# Patient Record
Sex: FEMALE | Race: WHITE | NOT HISPANIC OR LATINO
[De-identification: names, ages, dates, MRNs, and addresses within clinical notes are randomized per-mention and may not be internally consistent; named-entity substitution may affect disease eponyms.]

---

## 2023-11-01 PROBLEM — Z00.00 ENCOUNTER FOR PREVENTIVE HEALTH EXAMINATION: Status: ACTIVE | Noted: 2023-11-01

## 2023-11-06 ENCOUNTER — APPOINTMENT (OUTPATIENT)
Dept: BREAST CENTER | Facility: CLINIC | Age: 68
End: 2023-11-06
Payer: COMMERCIAL

## 2023-11-06 VITALS
HEIGHT: 65 IN | WEIGHT: 168 LBS | BODY MASS INDEX: 27.99 KG/M2 | TEMPERATURE: 98.4 F | DIASTOLIC BLOOD PRESSURE: 74 MMHG | HEART RATE: 74 BPM | OXYGEN SATURATION: 96 % | SYSTOLIC BLOOD PRESSURE: 127 MMHG

## 2023-11-06 VITALS — WEIGHT: 168 LBS | HEIGHT: 65 IN | BODY MASS INDEX: 27.99 KG/M2

## 2023-11-06 DIAGNOSIS — I25.10 ATHEROSCLEROTIC HEART DISEASE OF NATIVE CORONARY ARTERY W/OUT ANGINA PECTORIS: ICD-10-CM

## 2023-11-06 DIAGNOSIS — R92.8 OTHER ABNORMAL AND INCONCLUSIVE FINDINGS ON DIAGNOSTIC IMAGING OF BREAST: ICD-10-CM

## 2023-11-06 DIAGNOSIS — I07.1 RHEUMATIC TRICUSPID INSUFFICIENCY: ICD-10-CM

## 2023-11-06 DIAGNOSIS — Z87.19 PERSONAL HISTORY OF OTHER DISEASES OF THE DIGESTIVE SYSTEM: ICD-10-CM

## 2023-11-06 DIAGNOSIS — Z78.9 OTHER SPECIFIED HEALTH STATUS: ICD-10-CM

## 2023-11-06 DIAGNOSIS — M47.812 SPONDYLOSIS W/OUT MYELOPATHY OR RADICULOPATHY, CERVICAL REGION: ICD-10-CM

## 2023-11-06 DIAGNOSIS — I35.1 NONRHEUMATIC AORTIC (VALVE) INSUFFICIENCY: ICD-10-CM

## 2023-11-06 DIAGNOSIS — Z80.41 FAMILY HISTORY OF MALIGNANT NEOPLASM OF OVARY: ICD-10-CM

## 2023-11-06 DIAGNOSIS — Z80.1 FAMILY HISTORY OF MALIGNANT NEOPLASM OF TRACHEA, BRONCHUS AND LUNG: ICD-10-CM

## 2023-11-06 DIAGNOSIS — Z86.79 PERSONAL HISTORY OF OTHER DISEASES OF THE CIRCULATORY SYSTEM: ICD-10-CM

## 2023-11-06 DIAGNOSIS — Z86.39 PERSONAL HISTORY OF OTHER ENDOCRINE, NUTRITIONAL AND METABOLIC DISEASE: ICD-10-CM

## 2023-11-06 DIAGNOSIS — R59.0 LOCALIZED ENLARGED LYMPH NODES: ICD-10-CM

## 2023-11-06 PROCEDURE — 99204 OFFICE O/P NEW MOD 45 MIN: CPT

## 2023-11-06 RX ORDER — GABAPENTIN 100 MG/1
CAPSULE ORAL
Refills: 0 | Status: ACTIVE | COMMUNITY

## 2023-11-06 RX ORDER — ORAL SEMAGLUTIDE 14 MG/1
TABLET ORAL
Refills: 0 | Status: ACTIVE | COMMUNITY

## 2023-11-06 RX ORDER — LOSARTAN POTASSIUM 50 MG/1
50 TABLET, FILM COATED ORAL
Refills: 0 | Status: ACTIVE | COMMUNITY

## 2023-11-06 RX ORDER — LEVOTHYROXINE SODIUM 137 UG/1
TABLET ORAL
Refills: 0 | Status: ACTIVE | COMMUNITY

## 2023-11-06 RX ORDER — HYDROCHLOROTHIAZIDE 12.5 MG/1
12.5 CAPSULE ORAL
Refills: 0 | Status: ACTIVE | COMMUNITY

## 2023-11-06 RX ORDER — CHROMIUM 200 MCG
TABLET ORAL
Refills: 0 | Status: ACTIVE | COMMUNITY

## 2023-11-27 ENCOUNTER — NON-APPOINTMENT (OUTPATIENT)
Age: 68
End: 2023-11-27

## 2023-11-28 ENCOUNTER — APPOINTMENT (OUTPATIENT)
Dept: BREAST CENTER | Facility: CLINIC | Age: 68
End: 2023-11-28
Payer: COMMERCIAL

## 2023-11-28 VITALS
DIASTOLIC BLOOD PRESSURE: 67 MMHG | SYSTOLIC BLOOD PRESSURE: 113 MMHG | OXYGEN SATURATION: 95 % | WEIGHT: 167 LBS | HEART RATE: 82 BPM | BODY MASS INDEX: 27.79 KG/M2

## 2023-11-28 DIAGNOSIS — N61.0 MASTITIS WITHOUT ABSCESS: ICD-10-CM

## 2023-11-28 PROCEDURE — 99213 OFFICE O/P EST LOW 20 MIN: CPT

## 2023-11-28 RX ORDER — CEFADROXIL 500 MG/1
500 CAPSULE ORAL TWICE DAILY
Qty: 20 | Refills: 0 | Status: ACTIVE | COMMUNITY
Start: 2023-11-28 | End: 1900-01-01

## 2023-12-13 NOTE — ADDENDUM
[FreeTextEntry1] : I spent greater than 75% of consultation in face-to-face counseling and coordination of care for this patient with a palpable lower right axillary lymph node.

## 2023-12-19 ENCOUNTER — APPOINTMENT (OUTPATIENT)
Dept: BREAST CENTER | Facility: CLINIC | Age: 68
End: 2023-12-19
Payer: COMMERCIAL

## 2023-12-19 DIAGNOSIS — R59.0 LOCALIZED ENLARGED LYMPH NODES: ICD-10-CM

## 2023-12-19 DIAGNOSIS — Z80.41 FAMILY HISTORY OF MALIGNANT NEOPLASM OF OVARY: ICD-10-CM

## 2023-12-19 DIAGNOSIS — Z12.39 ENCOUNTER FOR OTHER SCREENING FOR MALIGNANT NEOPLASM OF BREAST: ICD-10-CM

## 2023-12-19 DIAGNOSIS — Z80.3 FAMILY HISTORY OF MALIGNANT NEOPLASM OF BREAST: ICD-10-CM

## 2023-12-19 PROCEDURE — 99213 OFFICE O/P EST LOW 20 MIN: CPT

## 2023-12-19 NOTE — REASON FOR VISIT
[Follow-Up: _____] : a [unfilled] follow-up visit [FreeTextEntry1] : The patient is a postmenopausal white female of [place] descent with a family history of breast and ovarian cancer.  She has been seen in our practice in the past.  She has been complaining of a palpable tender mass underneath her right axilla noted since October 2023.  Mammography and ultrasound did show a slightly enlarged chronic lymph node in the right axilla.  She developed the right chest wall rash which resolved on antibiotics.  She continues have this palpable lower right axillary lymph node and comes in now for follow-up.

## 2023-12-19 NOTE — HISTORY OF PRESENT ILLNESS
[FreeTextEntry1] : The patient is a 68-year-old  postmenopausal white female of [place] descent.  She underwent menarche at age 13 and had her first child at age 33.  She underwent menopause after a TALI/BSO at age 58 in 2013 and she never took any hormone replacement therapy.  She has a strong family history with her mother who had ovarian cancer at age 72 and BRCA tested negative.  She has a maternal cousin who had breast cancer at age 27.  Her brother had lung cancer at age 56.  She had been seen in our practice over 10 years ago for routine surveillance.  She noted a palpable and significantly tender lower right axillary mass in 2023.  She has had a known lower right axilla lymph node seen on routine screening mammography and ultrasound last dated back in 2022.  She was sent for diagnostic right breast mammography and ultrasound performed at [facility] on 2023 showing this palpable density to be the same chronic right axilla lymph node which is slightly increased in size measuring about 1.1 cm with a mildly thickened cortex at 3 mm.  The tenderness over this lymph node has returned and she has developed a significant pruritic rash on the upper right chest wall as well as shoulder and back.  She had been advised to just undergo her routine mammography and ultrasound again in 2023 and to come back for another clinical exam afterwards but she developed a pruritic right chest wall rash extending towards her shoulder and upper back and was placed on cefadroxil antibiotics at the end of November and the rash resolved.  She continues to have this palpable lower right axillary lymph node and comes in now for follow-up.

## 2023-12-19 NOTE — REVIEW OF SYSTEMS
[Breast Pain] : breast pain [Breast Lump] : breast lump [Hot Flashes] : hot flashes [Negative] : Heme/Lymph [de-identified] : right breast lump at 10 o clock

## 2023-12-19 NOTE — PAST MEDICAL HISTORY
[Postmenopausal] : The patient is postmenopausal [Menarche Age ____] : age at menarche was [unfilled] [Menopause Age____] : age at menopause was [unfilled] [unknown] : the patient is unsure of the date of her LMP [Total Preg ___] : G[unfilled] [Age At Live Birth ___] : Age at live birth: [unfilled] [History of Hormone Replacement Treatment] : has no history of hormone replacement treatment

## 2023-12-19 NOTE — PHYSICAL EXAM
[Normocephalic] : normocephalic [Atraumatic] : atraumatic [EOMI] : extra ocular movement intact [Supple] : supple [No Supraclavicular Adenopathy] : no supraclavicular adenopathy [No Cervical Adenopathy] : no cervical adenopathy [Examined in the supine and seated position] : examined in the supine and seated position [No dominant masses] : no dominant masses in right breast  [No dominant masses] : no dominant masses left breast [No Nipple Retraction] : no left nipple retraction [No Nipple Discharge] : no left nipple discharge [Breast Mass Right Breast ___cm] : no masses [Breast Mass Left Breast ___cm] : no masses [No Axillary Lymphadenopathy] : no left axillary lymphadenopathy [No Edema] : no edema [No Rashes] : no rashes [No Ulceration] : no ulceration [Breast Nipple Inversion] : nipples not inverted [Breast Nipple Flattening] : nipples not flattened [Breast Nipple Retraction] : nipples not retracted [Breast Nipple Fissures] : nipples not fissured [Breast Abnormal Lactation (Galactorrhea)] : no galactorrhea [Breast Abnormal Secretion Bloody Fluid] : no bloody discharge [Breast Abnormal Secretion Serous Fluid] : no serous discharge [Breast Abnormal Secretion Opalescent Fluid] : no milky discharge [de-identified] : On exam, the patient has ptotic C-cup breasts.  On palpation, I cannot feel any suspicious densities in either breast.  She does have this palpable stable lower right axillary lymph node measuring about 1.5 cm which is freely mobile and is now tender.  Her prior chest wall rash has resolved.  I cannot feel any other adenopathy. [de-identified] : Palpable lower right axilla lymph node which is freely mobile but the tenderness has resolved.  Her rash along her chest wall has resolved. [de-identified] : Single palpable 1.5 cm lower axillary lymph node which is freely mobile

## 2023-12-19 NOTE — ASSESSMENT
[FreeTextEntry1] : The patient is a 68-year-old  postmenopausal white female of [place] Island[place] descent.  She underwent menarche at age 13 and had her first child at age 33.  She underwent menopause after a TALI/BSO at age 58 in 2013 and she never took any hormone replacement therapy.  She is a diabetic and is on Rybelsus.  She has a strong family history with her mother who had ovarian cancer at age 72 and BRCA tested negative.  She has a maternal cousin who had breast cancer at age 27.  Her brother had lung cancer at age 56.  She had been seen in our practice over 10 years ago for routine surveillance.  She noted a palpable and significantly tender lower right axillary mass in 2023.  She has had a known lower right axilla lymph node seen on routine screening mammography and ultrasound last dated back in 2022.  She was sent for diagnostic right breast mammography and ultrasound performed at [facility] on 2023 showing this palpable density to be the same chronic right axilla lymph node which is slightly increased in size measuring about 1.1 cm with a mildly thickened cortex at 3 mm.  The tenderness over this lymph node had resolved.  I reviewed her right breast mammography and ultrasound which she brought in on CD-ROM from [facility] and indeed she does have this lymph node seen both on mammography and ultrasound with a slightly thickened cortex.  This was reportedly seen back in 2022 as well but is increased in size from 0.9 to 1.1 cm on ultrasound.  Given the history of this node becoming extremely tender and the tenderness resolving, I believed this was all just an inflammatory lymph node.  She did not have a recent vaccination on this side.  She then developed a significant rash on the upper aspect of her right chest extending over her right shoulder and onto her back in 2023 and I saw her on 2023 and it appeared to be a possible superficial cellulitis but did not appear to be breast related.  She still had this palpable lower right axillary soft lymph node and I felt that her tenderness was related to some inflammation in the node.  This appears to be a superficial cellulitis and I placed her on a course of cefadroxil antibiotics.  She did undergo her routine bilateral mammography and ultrasound on yesterday on 2023 and I reviewed the films on CD-ROM from [facility].  His lymph node appears unchanged and is very hyperdense on mammography.  On exam today, her prior rash has completely resolved but this lower axilla lymph node is still palpable but no longer tender.  Given that this node is not changing and has some suspicion on ultrasound, I would like to move forward with the ultrasound-guided core biopsy.  She has all her prior films and I will download these all into the PACS system and we will arrange this ultrasound-guided core biopsy here at [facility]. I again spoke to her about undergoing genetic panel testing and she has agreed and our genetic counselor reach will reach out to her for genetic testing.

## 2024-02-05 ENCOUNTER — RESULT REVIEW (OUTPATIENT)
Age: 69
End: 2024-02-05

## 2024-02-08 ENCOUNTER — NON-APPOINTMENT (OUTPATIENT)
Age: 69
End: 2024-02-08

## 2024-02-21 ENCOUNTER — APPOINTMENT (OUTPATIENT)
Dept: HEMATOLOGY ONCOLOGY | Facility: CLINIC | Age: 69
End: 2024-02-21

## 2024-03-11 NOTE — DISCUSSION/SUMMARY
[FreeTextEntry1] : REASON FOR CONSULT Faye Hanks is a 68-year-old female who was referred by Dr. Brian Wood for cancer genetic counseling and risk assessment due to family history of cancer.   RELEVANT MEDICAL HISTORY Ms. Hanks is a healthy individual who has never had cancer. She has a family history of cancer, see below.  Ms. Hanks recently underwent a recent biopsy for a lump in her breast which was negative for malignancy (2024).   OTHER MEDICAL AND SURGICAL HISTORY: -	Medical History: Diabetes, Hypertension, Hyperlipidemia, Hypothyroidism, cardiac disease.  -	Surgical History: Hip replacement, TALI/BSO.   PAST OB/GYN HISTORY: Height: 5'5" Weight: 165 lbs Obstetrical History:  Age at Menarche: 13 Menopausal with LMP at age 55 Age at First Live Birth: 29 Oral Contraceptive Use: Yes, about 5 years.  Hormone Replacement Therapy: No  CANCER SCREENING HISTORY:   Breast:  -	Mammography: most recent on 2023: Incomplete, USG recommended; Frequency: annual -	Sonography: most recent on 2024: Biopsy related USG; Frequency: Annual -	MRI: None.  -	Biopsies: most recent on 2024: Negative for malignancy.  GYN: TALI/BSO at age 58: done for non-precancerous reasons (additional surgery while undergoing surgery for bladder ?hernia) -	Pelvic Examination & Pap Smear: most recent reported in 2024: Normal; Frequency: Annual Colon: -	Colonoscopy: most recent reported in : 1 polyp; Frequency: 5 years Total 3 colonoscopies, total of 2 polyps -	Upper Endoscopy: most recent reported in : Normal, done for reflux.  Skin:   -	FBSE: most recent reported in : Normal; Frequency: Annual  -	Lesions biopsied/removed: most recent reported in : reportedly benign.   SOCIAL HISTORY: -	Occupation:  at a hospital.  -	Tobacco-product use: No. -	Second-hand smoke exposure: No.   FAMILY HISTORY: Maternal and paternal ancestry was reported as Azeri. Ashkenazi Amish ancestry was denied. A detailed family history of cancer was ascertained. Relevant diagnoses are detailed below and in the scanned pedigree.   Of note, Ms. Hanks's mother reportedly had genetic testing for BRCA1/2 in  which was negative.  	 RISK ASSESSMENT: Ms. Zavodsky's family history of ovarian and breast cancer on the same side of the family is suggestive of an inherited predisposition to breast and related cancers.   We recommended genetic testing for genes associated with breast and gynecological cancer. This test analyzes 19 genes: SHERI, BARD1, BRCA1, BRCA2, BRIP1, CDH1, CHEK2, EPCAM, MLH1, MSH2, MSH6, NF1, PALB2, PMS2, PTEN, RAD51C, RAD51D, STK11, and TP53.  We discussed the risks, benefits and limitations, and implications of genetic testing. We also discussed the psychosocial implications of genetic testing. Possible test results were reviewed with Ms. Hanks, along with associated medical management options. The Genetic Information Non-discrimination Act (ZARINA) was also reviewed.   Ms. Hanks consented to the above-mentioned genetic testing panel. Blood was drawn in our laboratory and sent to [facility] today.  PLAN: 1.	Blood drawn today will be sent to [facility] for analysis.  2.	We will contact Ms. Hanks once the results are available and will schedule a follow-up appointment, as needed. Results generally return in 2-3 weeks from the day the sample is received in the lab.  For any additional questions please call Cancer Genetics at [phone].   Naomi Rodgers MS, [facility] Genetic Counselor, Cancer Genetics  CC:  Dr. Brian Wood

## 2024-03-27 ENCOUNTER — NON-APPOINTMENT (OUTPATIENT)
Age: 69
End: 2024-03-27

## 2024-03-27 NOTE — DISCUSSION/SUMMARY
[FreeTextEntry1] : RESULTS TRANSMISSION Faye Hanks is a 69-year-old female who was called on 03/27/2024 for a discussion regarding her genetic testing results related to hereditary cancer predisposition.   Ms. Hanks was originally seen at [facility] on 02/21/2024 for hereditary cancer predisposition risk assessment due to family history of cancer. Ms. Hanks decided to pursue genetic testing using [facility] panel (Breast & Gyn panel, 19 genes).  TEST RESULTS: NEGATIVE  No pathogenic (disease-causing) variants or VUSs were detected in the following genes: SHERI, BARD1, BRCA1, BRCA2, BRIP1, CDH1, CHEK2, EPCAM, MLH1, MSH2, MSH6, NF1, PALB2, PMS2, PTEN, RAD51C, RAD51D, STK11, and TP53.  RESULTS INTERPRETATION AND ASSESSMENT: Given Ms. Hanks's personal and current reported family history of cancer, and her negative genetic test results, the following screening guidelines and risk-reducing recommendations were discussed:  BREAST: In the absence of other indications, Ms. Hanks should practice age-appropriate breast cancer screening as recommended for the general population.  OTHER: In the absence of other indications, Ms. Hanks should practice age-appropriate cancer screening of other organ systems as recommended for the general population.  We also discussed that, while no cause of the patient's personal and family history of cancer was identified, this result, while reassuring, does entirely not rule out a hereditary cancer risk in the patient. It is possible, although unlikely, the patient has a mutation in one of the genes tested that is not detectable by this analysis, or has a mutation in a different gene, either known or unknown. It is also possible there is a hereditary cancer predisposition in the family, but the patient did not inherit it.  We informed Ms. Hanks that our knowledge of genetics and inherited cancer conditions is changing rapidly. Therefore, we recommended that Ms. Hanks contact our office, every 2 to 3 years, to discuss relevant advances in cancer genetics.  We emphasized the importance of re-contacting us with updates regarding her personal and family history of cancer as well as any updates regarding additional cancer genetic test results performed for the patient and/or family members.  Such updates could possibly change our risk assessment and recommendations.   In addition, we discussed Ms. Hanks's sister could consider pursuing cancer risk assessment genetic counseling with the option of genetic testing.   PLAN: 1.See above for recommended screening and risk-reduction strategies. 2. Patient informed consult note will be available through their [facility] patient portal and genetic test results will be released via [facility] laboratory portal.  3. Ms. Hanks was encouraged to contact us every 2-3 years to discuss relevant advances in cancer genetics, or sooner if there are any changes in her personal or family history of cancer.  For any additional questions please call Cancer Genetics at [phone].   Naomi Rodgers MS, [facility] Genetic Counselor, Cancer Genetics  CC:  Patient Dr. Brian Wood